# Patient Record
Sex: FEMALE | Race: WHITE | Employment: UNEMPLOYED | URBAN - METROPOLITAN AREA
[De-identification: names, ages, dates, MRNs, and addresses within clinical notes are randomized per-mention and may not be internally consistent; named-entity substitution may affect disease eponyms.]

---

## 2021-01-16 ENCOUNTER — APPOINTMENT (OUTPATIENT)
Dept: GENERAL RADIOLOGY | Age: 61
DRG: 885 | End: 2021-01-16
Attending: EMERGENCY MEDICINE
Payer: COMMERCIAL

## 2021-01-16 ENCOUNTER — HOSPITAL ENCOUNTER (INPATIENT)
Age: 61
LOS: 4 days | Discharge: HOME | DRG: 885 | End: 2021-01-20
Attending: EMERGENCY MEDICINE | Admitting: PSYCHIATRY & NEUROLOGY
Payer: COMMERCIAL

## 2021-01-16 ENCOUNTER — APPOINTMENT (OUTPATIENT)
Dept: CT IMAGING | Age: 61
DRG: 885 | End: 2021-01-16
Attending: EMERGENCY MEDICINE
Payer: COMMERCIAL

## 2021-01-16 DIAGNOSIS — Z00.00 GENERAL MEDICAL EXAM: Primary | ICD-10-CM

## 2021-01-16 PROBLEM — F29 PSYCHOTIC DISORDER (HCC): Status: ACTIVE | Noted: 2021-01-16

## 2021-01-16 LAB
ALBUMIN SERPL-MCNC: 3.1 G/DL (ref 3.5–5)
ALBUMIN/GLOB SERPL: 0.8 {RATIO} (ref 1.1–2.2)
ALP SERPL-CCNC: 131 U/L (ref 45–117)
ALT SERPL-CCNC: 11 U/L (ref 12–78)
AMPHET UR QL SCN: NEGATIVE
ANION GAP SERPL CALC-SCNC: 7 MMOL/L (ref 5–15)
APAP SERPL-MCNC: <2 UG/ML (ref 10–30)
APPEARANCE UR: CLEAR
AST SERPL-CCNC: 14 U/L (ref 15–37)
BACTERIA URNS QL MICRO: ABNORMAL /HPF
BARBITURATES UR QL SCN: NEGATIVE
BASOPHILS # BLD: 0 K/UL (ref 0–0.1)
BASOPHILS NFR BLD: 0 % (ref 0–1)
BENZODIAZ UR QL: NEGATIVE
BILIRUB SERPL-MCNC: 0.4 MG/DL (ref 0.2–1)
BILIRUB UR QL: NEGATIVE
BUN SERPL-MCNC: 9 MG/DL (ref 6–20)
BUN/CREAT SERPL: 11 (ref 12–20)
CALCIUM SERPL-MCNC: 9.1 MG/DL (ref 8.5–10.1)
CANNABINOIDS UR QL SCN: NEGATIVE
CHLORIDE SERPL-SCNC: 104 MMOL/L (ref 97–108)
CO2 SERPL-SCNC: 25 MMOL/L (ref 21–32)
COCAINE UR QL SCN: NEGATIVE
COLOR UR: YELLOW
COMMENT, HOLDF: NORMAL
COVID-19 RAPID TEST, COVR: NOT DETECTED
CREAT SERPL-MCNC: 0.82 MG/DL (ref 0.55–1.02)
DIFFERENTIAL METHOD BLD: ABNORMAL
DRUG SCRN COMMENT,DRGCM: NORMAL
EOSINOPHIL # BLD: 0.3 K/UL (ref 0–0.4)
EOSINOPHIL NFR BLD: 2 % (ref 0–7)
EPITH CASTS URNS QL MICRO: ABNORMAL /LPF
ERYTHROCYTE [DISTWIDTH] IN BLOOD BY AUTOMATED COUNT: 15 % (ref 11.5–14.5)
ETHANOL SERPL-MCNC: <10 MG/DL
GLOBULIN SER CALC-MCNC: 4.1 G/DL (ref 2–4)
GLUCOSE SERPL-MCNC: 104 MG/DL (ref 65–100)
GLUCOSE UR STRIP.AUTO-MCNC: NEGATIVE MG/DL
HCT VFR BLD AUTO: 37 % (ref 35–47)
HEALTH STATUS, XMCV2T: NORMAL
HGB BLD-MCNC: 11.7 G/DL (ref 11.5–16)
HGB UR QL STRIP: ABNORMAL
IMM GRANULOCYTES # BLD AUTO: 0.1 K/UL (ref 0–0.04)
IMM GRANULOCYTES NFR BLD AUTO: 0 % (ref 0–0.5)
KETONES UR QL STRIP.AUTO: NEGATIVE MG/DL
LEUKOCYTE ESTERASE UR QL STRIP.AUTO: NEGATIVE
LYMPHOCYTES # BLD: 2.4 K/UL (ref 0.8–3.5)
LYMPHOCYTES NFR BLD: 14 % (ref 12–49)
MCH RBC QN AUTO: 27.5 PG (ref 26–34)
MCHC RBC AUTO-ENTMCNC: 31.6 G/DL (ref 30–36.5)
MCV RBC AUTO: 87.1 FL (ref 80–99)
METHADONE UR QL: NEGATIVE
MONOCYTES # BLD: 1.4 K/UL (ref 0–1)
MONOCYTES NFR BLD: 8 % (ref 5–13)
NEUTS SEG # BLD: 13.5 K/UL (ref 1.8–8)
NEUTS SEG NFR BLD: 76 % (ref 32–75)
NITRITE UR QL STRIP.AUTO: NEGATIVE
NRBC # BLD: 0 K/UL (ref 0–0.01)
NRBC BLD-RTO: 0 PER 100 WBC
OPIATES UR QL: NEGATIVE
PCP UR QL: NEGATIVE
PH UR STRIP: 6.5 [PH] (ref 5–8)
PLATELET # BLD AUTO: 373 K/UL (ref 150–400)
PMV BLD AUTO: 9.2 FL (ref 8.9–12.9)
POTASSIUM SERPL-SCNC: 3.5 MMOL/L (ref 3.5–5.1)
PROT SERPL-MCNC: 7.2 G/DL (ref 6.4–8.2)
PROT UR STRIP-MCNC: NEGATIVE MG/DL
RBC # BLD AUTO: 4.25 M/UL (ref 3.8–5.2)
RBC #/AREA URNS HPF: ABNORMAL /HPF (ref 0–5)
SALICYLATES SERPL-MCNC: <1.7 MG/DL (ref 2.8–20)
SAMPLES BEING HELD,HOLD: NORMAL
SODIUM SERPL-SCNC: 136 MMOL/L (ref 136–145)
SOURCE, COVRS: NORMAL
SP GR UR REFRACTOMETRY: 1.01 (ref 1–1.03)
SPECIMEN SOURCE, FCOV2M: NORMAL
SPECIMEN TYPE, XMCV1T: NORMAL
UA: UC IF INDICATED,UAUC: ABNORMAL
UROBILINOGEN UR QL STRIP.AUTO: 0.2 EU/DL (ref 0.2–1)
WBC # BLD AUTO: 17.6 K/UL (ref 3.6–11)
WBC URNS QL MICRO: ABNORMAL /HPF (ref 0–4)

## 2021-01-16 PROCEDURE — 81001 URINALYSIS AUTO W/SCOPE: CPT

## 2021-01-16 PROCEDURE — 80143 DRUG ASSAY ACETAMINOPHEN: CPT

## 2021-01-16 PROCEDURE — 87635 SARS-COV-2 COVID-19 AMP PRB: CPT

## 2021-01-16 PROCEDURE — 80053 COMPREHEN METABOLIC PANEL: CPT

## 2021-01-16 PROCEDURE — 71045 X-RAY EXAM CHEST 1 VIEW: CPT

## 2021-01-16 PROCEDURE — 80307 DRUG TEST PRSMV CHEM ANLYZR: CPT

## 2021-01-16 PROCEDURE — 82077 ASSAY SPEC XCP UR&BREATH IA: CPT

## 2021-01-16 PROCEDURE — 80179 DRUG ASSAY SALICYLATE: CPT

## 2021-01-16 PROCEDURE — 74011250637 HC RX REV CODE- 250/637: Performed by: STUDENT IN AN ORGANIZED HEALTH CARE EDUCATION/TRAINING PROGRAM

## 2021-01-16 PROCEDURE — 99285 EMERGENCY DEPT VISIT HI MDM: CPT

## 2021-01-16 PROCEDURE — 71046 X-RAY EXAM CHEST 2 VIEWS: CPT

## 2021-01-16 PROCEDURE — U0005 INFEC AGEN DETEC AMPLI PROBE: HCPCS

## 2021-01-16 PROCEDURE — 36415 COLL VENOUS BLD VENIPUNCTURE: CPT

## 2021-01-16 PROCEDURE — 65270000029 HC RM PRIVATE

## 2021-01-16 PROCEDURE — 90791 PSYCH DIAGNOSTIC EVALUATION: CPT

## 2021-01-16 PROCEDURE — 85025 COMPLETE CBC W/AUTO DIFF WBC: CPT

## 2021-01-16 PROCEDURE — 74011250636 HC RX REV CODE- 250/636: Performed by: EMERGENCY MEDICINE

## 2021-01-16 PROCEDURE — 70450 CT HEAD/BRAIN W/O DYE: CPT

## 2021-01-16 RX ORDER — LORAZEPAM 0.5 MG/1
1 TABLET ORAL
Status: COMPLETED | OUTPATIENT
Start: 2021-01-16 | End: 2021-01-16

## 2021-01-16 RX ORDER — LORAZEPAM 2 MG/ML
1 INJECTION INTRAMUSCULAR ONCE
Status: DISCONTINUED | OUTPATIENT
Start: 2021-01-16 | End: 2021-01-16 | Stop reason: CLARIF

## 2021-01-16 RX ORDER — DOXYCYCLINE HYCLATE 100 MG
100 TABLET ORAL
Status: COMPLETED | OUTPATIENT
Start: 2021-01-16 | End: 2021-01-16

## 2021-01-16 RX ADMIN — LORAZEPAM 1 MG: 0.5 TABLET ORAL at 17:58

## 2021-01-16 RX ADMIN — DOXYCYCLINE HYCLATE 100 MG: 100 TABLET, COATED ORAL at 17:27

## 2021-01-16 RX ADMIN — SODIUM CHLORIDE 1000 ML: 9 INJECTION, SOLUTION INTRAVENOUS at 15:18
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
## 2021-01-16 NOTE — BSMART NOTE
Bed board reports that on call psychiatrist Dr Luann Fuentes requests a PCR Covid result prior to acceptance due to patient's difficulty breathing and abnormal chest Xray. This information was conveyed to patient who says she is fine waiting for the PCR to result because she wants to be admitted to [facility] [place]. Charge nurse and ED provider are aware of plan of care.
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
## 2021-01-16 NOTE — ED NOTES
Pt found standing in room asking to go to the bathroom. PIV pulled out with NS and tubing attached. Pt cleaned and put in new hospital gown. Pt wheeled to bathroom, pt condition stable.

## 2021-01-16 NOTE — ED PROVIDER NOTES
HPI this is a 61-year-old female who according to the  has a prolonged history of of the type of behavior she is displaying presently. He states that she will stay in her room for days at a time and will not come out. She will laugh and cry intermittently for no apparent reason. He is he states that her affect has been off in recent days and has not had a coherent thought process that is been stable. Patient has been intermittently agitated, but not expressing any suicidal or homicidal ideation. She is not eating well or taking fluids very consistently. There has been an intermittent cough, but no fever or chills. She does complain of a lot of diarrhea over the last few weeks. No vomiting. Past Medical History:   Diagnosis Date    Psychiatric disorder        History reviewed. No pertinent surgical history. History reviewed. No pertinent family history.     Social History     Socioeconomic History    Marital status:      Spouse name: Not on file    Number of children: Not on file    Years of education: Not on file    Highest education level: Not on file   Occupational History    Not on file   Social Needs    Financial resource strain: Not on file    Food insecurity     Worry: Not on file     Inability: Not on file    Transportation needs     Medical: Not on file     Non-medical: Not on file   Tobacco Use    Smoking status: Not on file   Substance and Sexual Activity    Alcohol use: Not on file    Drug use: Not on file    Sexual activity: Not on file   Lifestyle    Physical activity     Days per week: Not on file     Minutes per session: Not on file    Stress: Not on file   Relationships    Social connections     Talks on phone: Not on file     Gets together: Not on file     Attends Orthodox service: Not on file     Active member of club or organization: Not on file     Attends meetings of clubs or organizations: Not on file     Relationship status: Not on file    Intimate partner violence     Fear of current or ex partner: Not on file     Emotionally abused: Not on file     Physically abused: Not on file     Forced sexual activity: Not on file   Other Topics Concern    Not on file   Social History Narrative    Not on file         ALLERGIES: Shellfish derived and Codeine    Review of Systems   Constitutional: Positive for fatigue. Negative for activity change, appetite change, chills and fever. HENT: Negative for ear pain, facial swelling, sore throat and trouble swallowing. Eyes: Negative for pain, discharge and visual disturbance. Respiratory: Negative for chest tightness, shortness of breath and wheezing. Cardiovascular: Negative for chest pain and palpitations. Gastrointestinal: Positive for diarrhea. Negative for abdominal pain, blood in stool, nausea and vomiting. Genitourinary: Negative for difficulty urinating, flank pain and hematuria. Musculoskeletal: Negative for arthralgias, joint swelling, myalgias and neck pain. Skin: Negative for color change and rash. Neurological: Negative for dizziness, weakness, numbness and headaches. Hematological: Negative for adenopathy. Does not bruise/bleed easily. Psychiatric/Behavioral: Positive for behavioral problems, confusion, hallucinations and sleep disturbance. The patient is hyperactive. All other systems reviewed and are negative. Vitals:    01/19/21 1200 01/19/21 1540 01/20/21 0745 01/20/21 1130   BP: 122/82 132/88 132/85 (!) 139/92   Pulse: 81 (!) 103 80 80   Resp: 16 16 16 16   Temp: 98.6 °F (37 °C) 98.2 °F (36.8 °C) 98 °F (36.7 °C) 98.5 °F (36.9 °C)   SpO2: 96%  96% 96%   Weight:       Height:                Physical Exam  Vitals signs and nursing note reviewed. Constitutional:       General: She is not in acute distress. Appearance: She is well-developed. Comments: Patient is easily arousable and communicative. She is cooperative.   She does not display currently the symptoms previously noted.  He does appear somewhat lethargic.  Eitel signs are as noted.   HENT:      Head: Normocephalic and atraumatic.      Nose: Nose normal.   Eyes:      General: No scleral icterus.     Conjunctiva/sclera: Conjunctivae normal.      Pupils: Pupils are equal, round, and reactive to light.   Neck:      Musculoskeletal: Normal range of motion and neck supple.      Thyroid: No thyromegaly.      Vascular: No JVD.      Trachea: No tracheal deviation.      Comments: No carotid bruits noted.  Cardiovascular:      Rate and Rhythm: Normal rate and regular rhythm.      Heart sounds: Normal heart sounds. No murmur. No friction rub. No gallop.    Pulmonary:      Effort: Pulmonary effort is normal. No respiratory distress.      Breath sounds: Normal breath sounds. No stridor. No wheezing, rhonchi or rales.   Chest:      Chest wall: No tenderness.   Abdominal:      General: Bowel sounds are normal. There is no distension.      Palpations: Abdomen is soft. There is no mass.      Tenderness: There is no abdominal tenderness. There is no guarding or rebound.   Musculoskeletal: Normal range of motion.         General: No tenderness.   Lymphadenopathy:      Cervical: No cervical adenopathy.   Skin:     General: Skin is warm and dry.      Findings: No erythema or rash.   Neurological:      Mental Status: She is alert and oriented to person, place, and time.      Cranial Nerves: No cranial nerve deficit or dysarthria.      Sensory: No sensory deficit.      Coordination: Coordination normal.      Deep Tendon Reflexes: Reflexes are normal and symmetric.   Psychiatric:         Mood and Affect: Mood is depressed.         Behavior: Behavior is agitated.         Thought Content: Thought content normal.         Judgment: Judgment normal.          MDM  Number of Diagnoses or Management Options     Amount and/or Complexity of Data Reviewed  Clinical lab tests: ordered and reviewed  Tests in the radiology section of CPT®: ordered and  reviewed  Decide to obtain previous medical records or to obtain history from someone other than the patient: yes  Review and summarize past medical records: yes  Discuss the patient with other providers: yes  Independent visualization of images, tracings, or specimens: yes    Risk of Complications, Morbidity, and/or Mortality  Presenting problems: high  Diagnostic procedures: high  Management options: high    Patient Progress  Patient progress: stable         Procedures  I have spoken with the  at length.  He is provided most of the pertinent history.  He is aware of the initial labs.  A chest x-ray and urine are pending.  Given the patient's white count of 17,000 we wanted to rule out any infectious etiology of the patient's symptoms.  Her differential count appears normal.  I have spoken at length with the counselor as well, and the patient will be admitted but there are no beds here.  He is waiting for medical clearance and will likely have to send the patient to [facility] or some of the facility here in [place].   is aware and in agreement.  They are attempting to get her admitted by involuntary commitment.  At the present time we are awaiting chest x-ray and urinalysis.    Patient will be signed out to Dr. Allen pending the balance of the lab and medical clearance.           /  Patient was cleared medically and transferred to the psychiatric unit in stable condition.   Amado Calderon MD     //

## 2021-01-17 LAB
SARS-COV-2, COV2: NOT DETECTED
SPECIMEN SOURCE, FCOV2M: NORMAL

## 2021-01-17 PROCEDURE — 74011250637 HC RX REV CODE- 250/637: Performed by: NURSE PRACTITIONER

## 2021-01-17 PROCEDURE — 65220000003 HC RM SEMIPRIVATE PSYCH

## 2021-01-17 RX ORDER — ALPRAZOLAM 1 MG/1
0.5 TABLET ORAL
COMMUNITY

## 2021-01-17 RX ORDER — OLANZAPINE 5 MG/1
5 TABLET ORAL
Status: DISCONTINUED | OUTPATIENT
Start: 2021-01-17 | End: 2021-01-20 | Stop reason: HOSPADM

## 2021-01-17 RX ORDER — BUPROPION HYDROCHLORIDE 300 MG/1
300 TABLET ORAL DAILY
COMMUNITY

## 2021-01-17 RX ORDER — HALOPERIDOL 5 MG/ML
5 INJECTION INTRAMUSCULAR
Status: DISCONTINUED | OUTPATIENT
Start: 2021-01-17 | End: 2021-01-19

## 2021-01-17 RX ORDER — HYDROXYZINE 50 MG/1
50 TABLET, FILM COATED ORAL
Status: DISCONTINUED | OUTPATIENT
Start: 2021-01-17 | End: 2021-01-18

## 2021-01-17 RX ORDER — BENZTROPINE MESYLATE 1 MG/1
1 TABLET ORAL
Status: DISCONTINUED | OUTPATIENT
Start: 2021-01-17 | End: 2021-01-20 | Stop reason: HOSPADM

## 2021-01-17 RX ORDER — QUETIAPINE FUMARATE 50 MG/1
50 TABLET, FILM COATED ORAL
COMMUNITY
End: 2021-01-20

## 2021-01-17 RX ORDER — OMEPRAZOLE 40 MG/1
40 CAPSULE, DELAYED RELEASE ORAL DAILY
COMMUNITY

## 2021-01-17 RX ORDER — LORAZEPAM 2 MG/ML
1 INJECTION INTRAMUSCULAR
Status: DISCONTINUED | OUTPATIENT
Start: 2021-01-17 | End: 2021-01-20 | Stop reason: HOSPADM

## 2021-01-17 RX ORDER — DIPHENHYDRAMINE HYDROCHLORIDE 50 MG/ML
50 INJECTION, SOLUTION INTRAMUSCULAR; INTRAVENOUS
Status: DISCONTINUED | OUTPATIENT
Start: 2021-01-17 | End: 2021-01-20 | Stop reason: HOSPADM

## 2021-01-17 RX ORDER — DEXTROAMPHETAMINE SACCHARATE, AMPHETAMINE ASPARTATE, DEXTROAMPHETAMINE SULFATE AND AMPHETAMINE SULFATE 5; 5; 5; 5 MG/1; MG/1; MG/1; MG/1
20 TABLET ORAL 4 TIMES DAILY
COMMUNITY
End: 2021-01-20

## 2021-01-17 RX ORDER — TRAZODONE HYDROCHLORIDE 50 MG/1
50 TABLET ORAL
Status: DISCONTINUED | OUTPATIENT
Start: 2021-01-17 | End: 2021-01-19

## 2021-01-17 RX ORDER — VENLAFAXINE HYDROCHLORIDE 150 MG/1
150 CAPSULE, EXTENDED RELEASE ORAL DAILY
COMMUNITY

## 2021-01-17 RX ORDER — ADHESIVE BANDAGE
30 BANDAGE TOPICAL DAILY PRN
Status: DISCONTINUED | OUTPATIENT
Start: 2021-01-17 | End: 2021-01-20 | Stop reason: HOSPADM

## 2021-01-17 RX ORDER — ACETAMINOPHEN 325 MG/1
650 TABLET ORAL
Status: DISCONTINUED | OUTPATIENT
Start: 2021-01-17 | End: 2021-01-20 | Stop reason: HOSPADM

## 2021-01-17 RX ORDER — DOXYCYCLINE HYCLATE 100 MG
100 TABLET ORAL EVERY 12 HOURS
Status: DISCONTINUED | OUTPATIENT
Start: 2021-01-17 | End: 2021-01-20 | Stop reason: HOSPADM

## 2021-01-17 RX ADMIN — DOXYCYCLINE HYCLATE 100 MG: 100 TABLET, COATED ORAL at 21:37

## 2021-01-17 RX ADMIN — ACETAMINOPHEN 650 MG: 325 TABLET ORAL at 20:13

## 2021-01-17 NOTE — PROGRESS NOTES
Pt arrives ambulatory to 7W Acute under voluntary status  UDS/BAL negative  Pt confused about why she is here upon admission, unable to recall events that led up to being in the hospital, she believed she was being taken to a medical unit due to her difficulty breathing. Pt asks if she can call her , RN agrees and pt gives verbal permission for RN to call pt's  and to speak to him. Per , pt has been having periods of altered mental status and emotional lability for \"a long time. \" He states he has kept a written record of events of pt's bizarre behavior. Pt cooperative with admission process, tearful. Signs consent for voluntary admission, pt verbalizes understanding the TDO process and says she does not want to do that. Skin assessment completed by Neisha Baldwin and Yossi Marr RN. No impairment noted, Kannan score is 23. Superficial scratch to R. Buttock and hip. Problem: Altered Thought Process (Adult/Pediatric)  Goal: *STG: Participates in treatment plan  Outcome: Progressing Towards Goal  Goal: *STG: Seeks staff when feelings of anxiety and fear arise  Outcome: Progressing Towards Goal     Problem: Falls - Risk of  Goal: *Absence of Falls  Description: Document Valencia Fall Risk and appropriate interventions in the flowsheet.   Outcome: Progressing Towards Goal  Note: Fall Risk Interventions:

## 2021-01-17 NOTE — ED NOTES
Report received from [address], [facility]. Care assumed.

## 2021-01-17 NOTE — BSMART NOTE
Pt to be admitted to [facility] by ARMANDO Doherty for Dr. Tre Lockwood to room# 728-2. Nursing report . Nursing staff notified of admission/Dotty.

## 2021-01-17 NOTE — ED NOTES
Bedside and Verbal shift change report given to [facility], RN (oncoming nurse) by Jesica Garcia RN (offgoing nurse). Report included the following information SBAR, ED Summary, MAR and Recent Results.

## 2021-01-17 NOTE — ED NOTES
Patient standing in doorway. Stated that it is too loud. And she would like to close her door. Patient educated on the importance of this RN being able to see her to keep her safe. Patient stated understanding and got back into the bed.

## 2021-01-17 NOTE — BH NOTES
TRANSFER - IN REPORT:    Verbal report received from [facility] on Ana Foster  being received from [facility] [place] [facility](unit) for ordered procedure      Report consisted of patients Situation, Background, Assessment and   Recommendations(SBAR). Information from the following report(s) SBAR was reviewed with the receiving nurse. Opportunity for questions and clarification was provided. Assessment completed upon patients arrival to unit and care assumed.

## 2021-01-18 PROCEDURE — 65220000003 HC RM SEMIPRIVATE PSYCH

## 2021-01-18 PROCEDURE — 74011250637 HC RX REV CODE- 250/637: Performed by: PSYCHIATRY & NEUROLOGY

## 2021-01-18 PROCEDURE — 74011250637 HC RX REV CODE- 250/637: Performed by: NURSE PRACTITIONER

## 2021-01-18 RX ORDER — BUPROPION HYDROCHLORIDE 150 MG/1
150 TABLET ORAL
COMMUNITY

## 2021-01-18 RX ORDER — HYDROXYZINE 50 MG/1
50 TABLET, FILM COATED ORAL
Status: DISCONTINUED | OUTPATIENT
Start: 2021-01-18 | End: 2021-01-20 | Stop reason: HOSPADM

## 2021-01-18 RX ORDER — CALCIUM CARBONATE 200(500)MG
400 TABLET,CHEWABLE ORAL
Status: DISCONTINUED | OUTPATIENT
Start: 2021-01-18 | End: 2021-01-20 | Stop reason: HOSPADM

## 2021-01-18 RX ORDER — PANTOPRAZOLE SODIUM 40 MG/1
40 TABLET, DELAYED RELEASE ORAL
Status: DISCONTINUED | OUTPATIENT
Start: 2021-01-18 | End: 2021-01-20 | Stop reason: HOSPADM

## 2021-01-18 RX ORDER — VENLAFAXINE HYDROCHLORIDE 150 MG/1
150 CAPSULE, EXTENDED RELEASE ORAL DAILY
Status: DISCONTINUED | OUTPATIENT
Start: 2021-01-18 | End: 2021-01-20 | Stop reason: HOSPADM

## 2021-01-18 RX ORDER — ALPRAZOLAM 0.5 MG/1
0.5 TABLET ORAL
Status: DISCONTINUED | OUTPATIENT
Start: 2021-01-18 | End: 2021-01-18

## 2021-01-18 RX ORDER — ALPRAZOLAM 1 MG/1
0.5 TABLET ORAL
Status: DISCONTINUED | OUTPATIENT
Start: 2021-01-18 | End: 2021-01-20 | Stop reason: HOSPADM

## 2021-01-18 RX ORDER — IBUPROFEN 200 MG
1 TABLET ORAL DAILY
Status: DISCONTINUED | OUTPATIENT
Start: 2021-01-18 | End: 2021-01-20 | Stop reason: HOSPADM

## 2021-01-18 RX ORDER — QUETIAPINE FUMARATE 25 MG/1
50 TABLET, FILM COATED ORAL
Status: DISCONTINUED | OUTPATIENT
Start: 2021-01-18 | End: 2021-01-19

## 2021-01-18 RX ORDER — BUTALBITAL, ACETAMINOPHEN, CAFFEINE AND CODEINE PHOSPHATE 50; 325; 40; 30 MG/1; MG/1; MG/1; MG/1
1 CAPSULE ORAL
COMMUNITY
End: 2021-01-20

## 2021-01-18 RX ORDER — BUPROPION HYDROCHLORIDE 100 MG/1
200 TABLET, EXTENDED RELEASE ORAL 2 TIMES DAILY
Status: DISCONTINUED | OUTPATIENT
Start: 2021-01-18 | End: 2021-01-20 | Stop reason: HOSPADM

## 2021-01-18 RX ADMIN — DOXYCYCLINE HYCLATE 100 MG: 100 TABLET, COATED ORAL at 08:07

## 2021-01-18 RX ADMIN — DOXYCYCLINE HYCLATE 100 MG: 100 TABLET, COATED ORAL at 20:22

## 2021-01-18 RX ADMIN — PANTOPRAZOLE SODIUM 40 MG: 40 TABLET, DELAYED RELEASE ORAL at 12:21

## 2021-01-18 RX ADMIN — CALCIUM CARBONATE (ANTACID) CHEW TAB 500 MG 400 MG: 500 CHEW TAB at 17:45

## 2021-01-18 RX ADMIN — VENLAFAXINE HYDROCHLORIDE 150 MG: 150 CAPSULE, EXTENDED RELEASE ORAL at 12:22

## 2021-01-18 RX ADMIN — ALPRAZOLAM 0.5 MG: 0.5 TABLET ORAL at 14:42

## 2021-01-18 RX ADMIN — ACETAMINOPHEN 650 MG: 325 TABLET ORAL at 06:28

## 2021-01-18 RX ADMIN — ACETAMINOPHEN 650 MG: 325 TABLET ORAL at 19:57

## 2021-01-18 RX ADMIN — QUETIAPINE FUMARATE 50 MG: 25 TABLET ORAL at 20:22

## 2021-01-18 RX ADMIN — BUPROPION HYDROCHLORIDE 200 MG: 100 TABLET, FILM COATED, EXTENDED RELEASE ORAL at 12:22

## 2021-01-18 RX ADMIN — BUPROPION HYDROCHLORIDE 200 MG: 100 TABLET, FILM COATED, EXTENDED RELEASE ORAL at 17:43

## 2021-01-18 NOTE — BH NOTES
GROUP THERAPY PROGRESS NOTE    Patient is participating in Coping Skills-Depression Group. Group time: 50 minutes    Personal goal for participation: reduce symptom of depression and improve well-being     Goal orientation: Personal    Group therapy participation: active    Therapeutic interventions reviewed and discussed: Group members were handed a worksheet that discussed four research-supported techniques to alleviate symptoms of depression. These techniques include behavioral activation, using social support, positive journaling, and practicing mindfulness. Each member was encouraged to try each activity in group and process how it can applied outside of group. Impression of participation: Mckenna Diaz actively participated in group. 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 Engaged in the group discussion and was very supportive of other group members.       Jose Adames, Supervisee in Social Work

## 2021-01-18 NOTE — PROGRESS NOTES
Received patient resting in bed. Respirations even and unlabored.  Will continue to monitor for safety

## 2021-01-18 NOTE — PROGRESS NOTES
Problem: Altered Thought Process (Adult/Pediatric)  Goal: *STG: Seeks staff when feelings of anxiety and fear arise  Outcome: Progressing Towards Goal  Active polite interaction with staff     Problem: Altered Thought Process (Adult/Pediatric)  Goal: *STG: Decreased hallucinations  Outcome: Progressing Towards Goal   Self reports AH much less      Problem: Falls - Risk of  Goal: *Absence of Falls  Description: Document Valencia Fall Risk and appropriate interventions in the flowsheet. Outcome: Progressing Towards Goal  Note: Fall Risk Interventions:     Gait is observed as steady No Patient Care Coordination Note on file.           Medication Interventions: Teach patient to arise slowly

## 2021-01-18 NOTE — INTERDISCIPLINARY ROUNDS
Behavioral Health Interdisciplinary Rounds     Patient Name: Lindsey Santiago  Age: 61 y.o. Room/Bed:  726/  Primary Diagnosis: <principal problem not specified>   Admission Status: Voluntary     Readmission within 30 days: no  Power of  in place:   Patient requires a blocked bed: no          Reason for blocked bed:     VTE Prophylaxis: No    Mobility needs/Fall risk: no  Flu Vaccine : no   Nutritional Plan: no  Consults:          Labs/Testing due today?: no    Sleep hours:  8      Participation in Care/Groups:    Medication Compliant?: Yes  PRNS (last 24 hours): Pain    Restraints (last 24 hours):  no     CIWA (range last 24 hours):     COWS (range last 24 hours):      Alcohol screening (AUDIT) completed -   AUDIT Score: 0     If applicable, date SBIRT discussed in treatment team AND documented:   AUDIT Screen Score: AUDIT Score: 0      Tobacco - patient is a smoker: Have You Used Tobacco in the Past 30 Days: Yes  Illegal Drugs use: Have You Used Any Illegal Substances Over the Past 12 Months: No    24 hour chart check complete: yes     Patient goal(s) for today:   Treatment team focus/goals: Plan to assess for medications and discharge needs. Progress note : She was pleasant and compliant with her treatment. Denies SI. She talked about her home stressors. LOS:  2  Expected LOS: Wednesday      Financial concerns/prescription coverage: [employer]   Family contact:  Does not want her  and family involved      Family requesting physician contact today:  Discharge plan: she will return home when ready for discharge.    Access to weapons :  no       Outpatient provider(s): Dr Everton Medellin and Dr. Luz Maria Travis   Patient's preferred phone number for follow up call :   Patient's preferred e-mail address :  Participating treatment team members: Mamie Carroll, PharmD. - Arabella Yung, RN
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
## 2021-01-18 NOTE — PROGRESS NOTES
Laboratory Monitoring for Antipsychotics: This patient is currently prescribed the following medication(s):   Current Facility-Administered Medications   Medication Dose Route Frequency    QUEtiapine (SEROquel) tablet 50 mg  50 mg Oral QHS    [START ON 1/19/2021] venlafaxine-SR (EFFEXOR-XR) capsule 150 mg  150 mg Oral DAILY    buPROPion SR (WELLBUTRIN SR) tablet 200 mg  200 mg Oral BID    pantoprazole (PROTONIX) tablet 40 mg  40 mg Oral ACB    influenza vaccine 2020-21 (6 mos+)(PF) (FLUARIX/FLULAVAL/FLUZONE QUAD) injection 0.5 mL  0.5 mL IntraMUSCular PRIOR TO DISCHARGE    doxycycline (VIBRA-TABS) tablet 100 mg  100 mg Oral Q12H     The following labs have been completed for monitoring of antipsychotics and/or mood stabilizers:    Height, Weight, BMI Estimation  Estimated body mass index is 26.31 kg/m² as calculated from the following:    Height as of this encounter: 170.2 cm (67\"). Weight as of this encounter: 76.2 kg (168 lb). Vital Signs/Blood Pressure  Visit Vitals  /85 (BP 1 Location: Right arm, BP Patient Position: Sitting)   Pulse 72   Temp 98.1 °F (36.7 °C)   Resp 16   Ht 170.2 cm (67\")   Wt 76.2 kg (168 lb)   SpO2 97%   BMI 26.31 kg/m²     Renal Function, Hepatic Function and Chemistry  Estimated Creatinine Clearance: 77.6 mL/min (based on SCr of 0.82 mg/dL). Lab Results   Component Value Date/Time    Sodium 136 01/16/2021 12:17 PM    Potassium 3.5 01/16/2021 12:17 PM    Chloride 104 01/16/2021 12:17 PM    CO2 25 01/16/2021 12:17 PM    Anion gap 7 01/16/2021 12:17 PM    BUN 9 01/16/2021 12:17 PM    Creatinine 0.82 01/16/2021 12:17 PM    BUN/Creatinine ratio 11 (L) 01/16/2021 12:17 PM    Bilirubin, total 0.4 01/16/2021 12:17 PM    Protein, total 7.2 01/16/2021 12:17 PM    Albumin 3.1 (L) 01/16/2021 12:17 PM    Globulin 4.1 (H) 01/16/2021 12:17 PM    A-G Ratio 0.8 (L) 01/16/2021 12:17 PM    ALT (SGPT) 11 (L) 01/16/2021 12:17 PM    AST (SGOT) 14 (L) 01/16/2021 12:17 PM    Alk.  phosphatase 131 (H) 01/16/2021 12:17 PM     Lab Results   Component Value Date/Time    Glucose 104 (H) 01/16/2021 12:17 PM     No results found for: HBA1C, HGBE8, OCM0MXWY    Hematology  Lab Results   Component Value Date/Time    WBC 17.6 (H) 01/16/2021 12:17 PM    RBC 4.25 01/16/2021 12:17 PM    HGB 11.7 01/16/2021 12:17 PM    HCT 37.0 01/16/2021 12:17 PM    MCV 87.1 01/16/2021 12:17 PM    MCH 27.5 01/16/2021 12:17 PM    MCHC 31.6 01/16/2021 12:17 PM    RDW 15.0 (H) 01/16/2021 12:17 PM    PLATELET 043 79/54/5483 12:17 PM     Lipids  No results found for: CHOL, CHOLX, CHLST, CHOLV, 367887, HDL, HDLP, LDL, LDLC, DLDLP, TGLX, TRIGL, TRIGP, CHHD, CHHDX    Thyroid Function  No results found for: TSH, TSH2, TSH3, TSHP, TSHELE, TT3, T3U, T3UP, FRT3, FT3, FT4, FT4P, T4, T4P, FT4T, TT7, TSHEXT    Pregnancy Status  No results found for: HCGUQC, [id number], [id number], [id number], PREGU, POCHCG, MHCGN, HCGQR, THCGA1, SHCG, HCGN, HCGURQLPOC    Assessment/Plan:  Will order lipid panel and hemoglobin A1c or fasting glucose to complete the recommended baseline laboratory monitoring based on the patient's current medication regimen.         Анна Ahmadi, CLAUDIAD

## 2021-01-18 NOTE — BH NOTES
PRN Medication Documentation    Specific patient behavior that led to need for PRN medication: Pt c/o migraine/headache  Staff interventions attempted prior to PRN being given: Therapeutic interaction  PRN medication given: tylenol 650mg PO  Patient response/effectiveness of PRN medication: Will continue to assess and monitor

## 2021-01-18 NOTE — PROGRESS NOTES
Problem: Altered Thought Process (Adult/Pediatric)  Goal: *STG: Complies with medication therapy  Outcome: Progressing Towards Goal     Problem: Altered Thought Process (Adult/Pediatric)  Goal: *STG: Decreased delusional thinking  Outcome: Progressing Towards Goal     Patient received in hallway talking to staff. She states she is moving to General Unit. Pt pleasant and cooperative. Pt in no acute distress. Staff will continue to monitor every 15 minutes for safety.
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
## 2021-01-18 NOTE — BH NOTES
At 2015:  TRANSFER - IN REPORT:    Verbal report received from Bryan RN (name) on Boogie Harris  being transferred from [facility] Acute (unit) for routine progression of care      Report consisted of patients Situation, Background, Assessment and   Recommendations(SBAR). Information from the following report(s) SBAR was reviewed with the receiving nurse. Opportunity for questions and clarification was provided. Assessment completed upon patients arrival to unit and care assumed. PTA medication Fioricet has been confirmed at 1 cap Q 4 hours PRN for [place]. Per Dr. Justyn Torres will not be started at this time. Lewis Munson, Pharmacist, aware.

## 2021-01-18 NOTE — BH NOTES
GROUP THERAPY PROGRESS NOTE    Patient is participating in Process group. Group time: 40 minutes    Personal goal for participation: To process feelings around situation     Goal orientation: Personal    Group therapy participation: active    Therapeutic interventions reviewed and discussed: Group discussion was around the code Blue that occurred on the unit earlier. Patients shared their thoughts and feelings around situation and what it meant to them. Group members were encouraged to support each other and practice the coping skill: emotional release. Finally, group concluded with a meditation to alleviate feelings of anxiety. Impression of participation: Lorene Bustos actively participated in group. Remains supportive of other group members. Patient was slightly tearful at the end of group when she told the members that her  of 28 years does not want to be with her anymore.      Jose Adames, Supervisee in Social Work

## 2021-01-18 NOTE — PROGRESS NOTES
Admission Medication Reconciliation:    Information obtained from:  Communication with [facility] pharmacy ([phone]), [facility]  RxQuery data available¹:  NO    Comments/Recommendations: Updated PTA meds/reviewed patient's allergies. 1)  Pharmacist called [facility] pharmacy to confirm PTA medications. Patient reports that she takes alprazolam and generic Adderall PRN. She reports that she does take her medications regularly. 2)  Medication changes (since last review): Added  - Fioricet - take 1 tablet q4h PRN - last filled 12/21/20    Adjusted  - dextroamph/amphetamin 20mg QID (from BID)    3)  The [place] Prescription Monitoring Program () was assessed to determine fill history of any controlled medications. The patient has been filling alprazolam and generic Adderall for >2 years. Currently prescribed by Dr. Gladys Villanueva. ¹RxQuery pharmacy benefit data reflects medications filled and processed through the patient's insurance, however   this data does NOT capture whether the medication was picked up or is currently being taken by the patient. Allergies:  Shellfish derived and Codeine    Significant PMH/Disease States:   Past Medical History:   Diagnosis Date    Psychiatric disorder      Chief Complaint for this Admission:    Chief Complaint   Patient presents with    Altered mental status    Mental Health Problem     Prior to Admission Medications:   Prior to Admission Medications   Prescriptions Last Dose Informant Taking? ALPRAZolam (XANAX) 1 mg tablet Unknown at Unknown time  No   Sig: Take 0.5 mg by mouth four (4) times daily as needed for Anxiety. Indications: anxious   QUEtiapine (SEROquel) 50 mg tablet Unknown at Unknown time  No   Sig: Take 50 mg by mouth nightly.  1-2 tabs   Indications: repeated episodes of anxiety   buPROPion XL (WELLBUTRIN XL) 150 mg tablet Unknown at Unknown time  No   Sig: Take 150 mg by mouth every morning. (take with 300mg XL for total of 450mg/day)   buPROPion XL (Wellbutrin XL) 300 mg XL tablet Unknown at Unknown time  No   Sig: Take 300 mg by mouth daily. (take with 150mg XL for total of 450mg/day)  Indications: anxiousness associated with depression   codeine-butalbital-acetaminophen-caffeine (FIORICET WITH CODEINE) -02-30 mg capsule Unknown at Unknown time  No   Sig: Take 1 Cap by mouth every four (4) hours as needed for Headache. dextroamphetamine-amphetamine (AdderalL) 20 mg tablet Unknown at Unknown time  No   Sig: Take 20 mg by mouth four (4) times daily. Indications: attention deficit disorder with hyperactivity   omeprazole (PRILOSEC) 40 mg capsule Unknown at Unknown time  No   Sig: Take 40 mg by mouth daily. Indications: stress ulcer prevention   venlafaxine-SR (Effexor XR) 150 mg capsule Unknown at Unknown time  No   Sig: Take 150 mg by mouth daily.  Indications: anxiousness associated with depression      Facility-Administered Medications: None     [place], [place]

## 2021-01-19 LAB
CHOLEST SERPL-MCNC: 195 MG/DL
EST. AVERAGE GLUCOSE BLD GHB EST-MCNC: 114 MG/DL
HBA1C MFR BLD: 5.6 % (ref 4–5.6)
HDLC SERPL-MCNC: 49 MG/DL
HDLC SERPL: 4 {RATIO} (ref 0–5)
LDLC SERPL CALC-MCNC: 122.4 MG/DL (ref 0–100)
LIPID PROFILE,FLP: ABNORMAL
TRIGL SERPL-MCNC: 118 MG/DL (ref ?–150)
VLDLC SERPL CALC-MCNC: 23.6 MG/DL

## 2021-01-19 PROCEDURE — 36415 COLL VENOUS BLD VENIPUNCTURE: CPT

## 2021-01-19 PROCEDURE — 83036 HEMOGLOBIN GLYCOSYLATED A1C: CPT

## 2021-01-19 PROCEDURE — 74011250637 HC RX REV CODE- 250/637: Performed by: PSYCHIATRY & NEUROLOGY

## 2021-01-19 PROCEDURE — 65220000003 HC RM SEMIPRIVATE PSYCH

## 2021-01-19 PROCEDURE — 80061 LIPID PANEL: CPT

## 2021-01-19 PROCEDURE — 74011250637 HC RX REV CODE- 250/637: Performed by: NURSE PRACTITIONER

## 2021-01-19 RX ORDER — QUETIAPINE FUMARATE 100 MG/1
100 TABLET, FILM COATED ORAL
Status: DISCONTINUED | OUTPATIENT
Start: 2021-01-19 | End: 2021-01-20 | Stop reason: HOSPADM

## 2021-01-19 RX ADMIN — DOXYCYCLINE HYCLATE 100 MG: 100 TABLET, COATED ORAL at 08:23

## 2021-01-19 RX ADMIN — BUPROPION HYDROCHLORIDE 200 MG: 100 TABLET, FILM COATED, EXTENDED RELEASE ORAL at 08:23

## 2021-01-19 RX ADMIN — PANTOPRAZOLE SODIUM 40 MG: 40 TABLET, DELAYED RELEASE ORAL at 06:29

## 2021-01-19 RX ADMIN — ACETAMINOPHEN 650 MG: 325 TABLET ORAL at 06:45

## 2021-01-19 RX ADMIN — VENLAFAXINE HYDROCHLORIDE 150 MG: 150 CAPSULE, EXTENDED RELEASE ORAL at 08:23

## 2021-01-19 RX ADMIN — BUPROPION HYDROCHLORIDE 200 MG: 100 TABLET, FILM COATED, EXTENDED RELEASE ORAL at 16:08

## 2021-01-19 RX ADMIN — QUETIAPINE FUMARATE 100 MG: 100 TABLET ORAL at 21:07

## 2021-01-19 RX ADMIN — ALPRAZOLAM 0.5 MG: 0.5 TABLET ORAL at 08:23

## 2021-01-19 RX ADMIN — DOXYCYCLINE HYCLATE 100 MG: 100 TABLET, COATED ORAL at 21:07

## 2021-01-19 RX ADMIN — ALPRAZOLAM 0.5 MG: 0.5 TABLET ORAL at 16:08

## 2021-01-19 NOTE — BH NOTES
GROUP THERAPY PROGRESS NOTE    Patient is participating in Goals Group. Group time: 50 minutes    Personal goal for participation: To come up with a plan of things to help maintain wellness of the whole person    Goal orientation: Personal    Group therapy participation: active    Therapeutic interventions reviewed and discussed: Group discussion of the wellness wheel and how they plan to balance themselves by looking at spiritual, physical, occupations, intellectual, social/family, and emotional aspects of their lives. Impression of participation: Annika Dailey actively participated in group. Patient was tearful when she talked about her relationship with her son. Supportive of other group members. Engaged in conversation with the group members.      Jose Adames, Supervisee in Social Work

## 2021-01-19 NOTE — BH NOTES
GROUP THERAPY PROGRESS NOTE    Patient did not participate in Process Group.      Jose Adames, Supervisee in Social Work

## 2021-01-19 NOTE — BH NOTES
PRN Medication Documentation    Specific patient behavior that led to need for PRN medication: pt request for c/o anxiety  Staff interventions attempted prior to PRN being given: listening, coping skills  PRN medication given: Xanax 0.5mg PO given @ 28-81-33-70  Patient response/effectiveness of PRN medication: 0923: patient voiced no further complaints at this time

## 2021-01-19 NOTE — BH NOTES
PRN Medication Documentation    Specific patient behavior that led to need for PRN medication: pt c/o increased head pain, 7/10  Staff interventions attempted prior to PRN being given: decreased stimuli  PRN medication given: tylenol  Patient response/effectiveness of PRN medication: will continue to assess

## 2021-01-19 NOTE — PROGRESS NOTES
Problem: Discharge Planning  Goal: *Discharge to safe environment  Outcome: Progressing Towards Goal  Note: Discharge 1/20/21 and follow-up with OP providers  Goal: *Knowledge of medication management  Outcome: Progressing Towards Goal  Note: Acknowledges understanding of the prescribed medications. Goal: *Knowledge of discharge instructions  Outcome: Progressing Towards Goal  Note: Pt acknowledges she will discharge home, 1/20/21.

## 2021-01-19 NOTE — GROUP NOTE
TAZ  GROUP DOCUMENTATION INDIVIDUAL                                                                          Group Therapy Note    Date: 1/18/2021    Group Start Time: 1900  Group End Time: 1930  Group Topic: Reflection/Relaxation    [facility] 7W ACUTE BEHA Radha Thornton    IP [address] [facility] GROUP DOCUMENTATION GROUP    Group Therapy Note    Attendees: 5          Attendance: Attended    Patient's Goal:  n/a    Interventions/techniques: Provide feedback    Follows Directions:  Followed directions    Interactions: Interacted appropriately    Mental Status: Happy    Behavior/appearance: Cooperative    Goals Achieved: Discussed coping      Additional Notes:      March Lanes

## 2021-01-19 NOTE — PROGRESS NOTES
Problem: Falls - Risk of  Goal: *Absence of Falls  Description: Document Thora Bare Fall Risk and appropriate interventions in the flowsheet. Outcome: Progressing Towards Goal  Note: Fall Risk Interventions:            Medication Interventions: Teach patient to arise slowly              Pt. Received resting in bed,NAD, respirations even and unlabored. Will continue q15 safety rounds.

## 2021-01-19 NOTE — ROUTINE PROCESS
2020: TRANSFER - IN REPORT:    Verbal report received from Amado (name) on Yuni Mir  being received from acute (unit) for routine progression of care      Report consisted of patients Situation, Background, Assessment and   Recommendations(SBAR). Information from the following report(s) SBAR was reviewed with the receiving nurse. Opportunity for questions and clarification was provided. Assessment completed upon patients arrival to unit and care assumed.

## 2021-01-19 NOTE — PROGRESS NOTES
Problem: Altered Thought Process (Adult/Pediatric)  Goal: *STG: Participates in treatment plan  Outcome: Progressing Towards Goal  Patient states that she didn't sleep well due to roommate's snoring. Mood is bright and patient is smiling. Spoke to  yesterday and stated that it went well. Is ready for discharge. Medications reviewed. Continue to monitor.   Goal: *STG: Seeks staff when feelings of anxiety and fear arise  Outcome: Progressing Towards Goal  Goal: *STG: Complies with medication therapy  Outcome: Progressing Towards Goal  Goal: *STG: Attends activities and groups  Outcome: Progressing Towards Goal  Goal: *STG: Demonstrates ability to understand and use improved judgment in daily activities and relationships  Outcome: Progressing Towards Goal  Goal: Interventions  Outcome: Progressing Towards Goal

## 2021-01-19 NOTE — BH NOTES
Chief Complaint:  I feel good today. Length of Stay: 3 Days    Interval History:  Ms. Hermann Lara reports feeling better today. Says she slept poorly last night as her roommate snored very loudly. Denies any SI or plan. Says she had a \"cordial\" conversation with her . Pleasant and calm during the interview. No adverse events are noted from her medications and remains committed to taking them. Past Medical History:  Past Medical History:   Diagnosis Date    Psychiatric disorder            Labs:  Lab Results   Component Value Date/Time    WBC 17.6 (H) 01/16/2021 12:17 PM    HGB 11.7 01/16/2021 12:17 PM    HCT 37.0 01/16/2021 12:17 PM    PLATELET 354 37/58/0467 12:17 PM    MCV 87.1 01/16/2021 12:17 PM      Lab Results   Component Value Date/Time    Sodium 136 01/16/2021 12:17 PM    Potassium 3.5 01/16/2021 12:17 PM    Chloride 104 01/16/2021 12:17 PM    CO2 25 01/16/2021 12:17 PM    Anion gap 7 01/16/2021 12:17 PM    Glucose 104 (H) 01/16/2021 12:17 PM    BUN 9 01/16/2021 12:17 PM    Creatinine 0.82 01/16/2021 12:17 PM    BUN/Creatinine ratio 11 (L) 01/16/2021 12:17 PM    GFR est AA >60 01/16/2021 12:17 PM    GFR est non-AA >60 01/16/2021 12:17 PM    Calcium 9.1 01/16/2021 12:17 PM    Bilirubin, total 0.4 01/16/2021 12:17 PM    Alk.  phosphatase 131 (H) 01/16/2021 12:17 PM    Protein, total 7.2 01/16/2021 12:17 PM    Albumin 3.1 (L) 01/16/2021 12:17 PM    Globulin 4.1 (H) 01/16/2021 12:17 PM    A-G Ratio 0.8 (L) 01/16/2021 12:17 PM    ALT (SGPT) 11 (L) 01/16/2021 12:17 PM      Vitals:    01/18/21 1244 01/18/21 1542 01/19/21 0745 01/19/21 1200   BP: 122/81 130/88 (!) 130/94 122/82   Pulse: 86 (!) 102 79 81   Resp: 16 16 16 16   Temp: 98.2 °F (36.8 °C) 97.8 °F (36.6 °C) 97.7 °F (36.5 °C) 98.6 °F (37 °C)   SpO2: 96% 98% 96% 96%   Weight:       Height:             Current Facility-Administered Medications   Medication Dose Route Frequency Provider Last Rate Last Admin    QUEtiapine (SEROquel) tablet 50 mg 50 mg Oral QHS Hao Wren MD   50 mg at 01/18/21 2022    venlafaxine-SR (EFFEXOR-XR) capsule 150 mg  150 mg Oral DAILY Hao Wren MD   150 mg at 01/19/21 6484    buPROPion SR (WELLBUTRIN SR) tablet 200 mg  200 mg Oral BID Hao Wren MD   200 mg at 01/19/21 4525    ALPRAZolam (XANAX) tablet 0.5 mg  0.5 mg Oral TID PRN Hao Wren MD   0.5 mg at 01/19/21 7888    pantoprazole (PROTONIX) tablet 40 mg  40 mg Oral ACB Hao Wren MD   40 mg at 01/19/21 0629    nicotine (NICODERM CQ) 14 mg/24 hr patch 1 Patch  1 Patch TransDERmal DAILY Hao Wren MD   1 Patch at 01/19/21 8862    hydrOXYzine HCL (ATARAX) tablet 50 mg  50 mg Oral TID PRN Hao Wren MD        calcium carbonate (TUMS) chewable tablet 400 mg [elemental]  400 mg Oral Q8H PRN Hao Wren MD   400 mg at 01/18/21 1745    OLANZapine (ZyPREXA) tablet 5 mg  5 mg Oral Q6H PRN Beth Bradley NP        haloperidol lactate (HALDOL) injection 5 mg  5 mg IntraMUSCular Q6H PRN Beth Bradley NP        benztropine (COGENTIN) tablet 1 mg  1 mg Oral BID PRN Beth Bradley NP        diphenhydrAMINE (BENADRYL) injection 50 mg  50 mg IntraMUSCular BID PRN Beth Bradley NP        LORazepam (ATIVAN) injection 1 mg  1 mg IntraMUSCular Q4H PRN Beth Bradley, NP        traZODone (DESYREL) tablet 50 mg  50 mg Oral QHS PRN Beth Bradley NP        acetaminophen (TYLENOL) tablet 650 mg  650 mg Oral Q4H PRN Beth Bradley NP   650 mg at 01/19/21 0645    magnesium hydroxide (MILK OF MAGNESIA) 400 mg/5 mL oral suspension 30 mL  30 mL Oral DAILY PRN Adrián DALLAS NP        influenza vaccine 2020-21 (6 mos+)(PF) (FLUARIX/FLULAVAL/FLUZONE QUAD) injection 0.5 mL  0.5 mL IntraMUSCular PRIOR TO DISCHARGE Hao Wren MD        doxycycline (VIBRA-TABS) tablet 100 mg  100 mg Oral Q12H Beth Bradley NP   100 mg at 01/19/21 0774         Mental Status Exam:  Eye contact: fair  Grooming: fair  Psychomotor activity: WNL  Speech is spontaneous, coherent  Mood is \"fair\"  Affect: reactive  Perception: Denies any AH or VH  Suicidal ideation: Denies any SI or plan  Cognition is grossly intact       Physical Exam:  Body habitus: Body mass index is 26.31 kg/m². Musculoskeletal system: normal gait  Tremor - neg  Cog wheeling - neg      Assessment and Plan:  Joann Phelan meets criteria for a diagnosis of Mood disorder unspecified, rule out recurrent major depression, rule out bipolar II disorder. Continue the medication regimen as prescribed  Disposition planning to continue and plan for discharge home tomorrow. I certify that this patients inpatient psychiatric hospital services furnished since the previous certification were, and continue to be, required for treatment that could reasonably be expected to improve the patient's condition, or for diagnostic study, and that the patient continues to need, on a daily basis, active treatment furnished directly by or requiring the supervision of inpatient psychiatric facility personnel. In addition, the hospital records show that services furnished were intensive treatment services, admission or related services, or equivalent services.

## 2021-01-19 NOTE — PROGRESS NOTES
Problem: Altered Thought Process (Adult/Pediatric)  Goal: *STG: Complies with medication therapy  Outcome: Progressing Towards Goal     Visible on the unit. Anxious mood noted. Pt denies SI. Compliant with meals and medications. Pt has not reported any concerns at this time.

## 2021-01-19 NOTE — SUICIDE SAFETY PLAN
SAFETY PLAN    A suicide Safety Plan is a document that supports someone when they are having thoughts of suicide. Warning Signs that indicate a suicidal crisis may be developing: What (situations, thoughts, feelings, body sensations, behaviors, etc.) do you experience that lets you know you are beginning to think about suicide? 1.   2.   3. Internal Coping Strategies:  What things can I do (relaxation techniques, hobbies, physical activities, etc.) to take my mind off my problems without contacting another person? 1.   2.   3.     People and social settings that provide distraction: Who can I call or where can I go to distract me? 1. Name:   Phone:   2. Name:   Phone:    3. Place:             4. Place:     People whom I can ask for help: Who can I call when I need help - for example, friends, family, clergy, someone else? 1. Name:                 Phone:   2. Name:   Phone:   3. Name:   Phone:     Professionals or [address] [place], [place] [address]vd I can contact during a crisis: Who can I call for help - for example, my doctor, my psychiatrist, my psychologist, a mental health provider, a suicide hotline? 1. Clinician Name:  Dr. Rubi Hammer   Phone:       Clinician Pager or Emergency Contact #:     2. Clinician Name: Dr. Stefanie Rubi    Phone:       Clinician Pager or Emergency Contact #:     3. Suicide Prevention Lifeline: 3-692-716-TALK (8581)    4. [address] [place] [address] Emergency Services -  for example, [facility] suicide hotline, [facility] Hotline: 211      Emergency Services Address: [address] [place] [address]       Emergency Services Phone: [phone]     Making the environment safe: How can I make my environment (house/apartment/living space) safer? For example, can I remove guns, medications, and other items?   1.   2.

## 2021-01-20 VITALS
HEART RATE: 80 BPM | BODY MASS INDEX: 26.37 KG/M2 | WEIGHT: 168 LBS | DIASTOLIC BLOOD PRESSURE: 92 MMHG | OXYGEN SATURATION: 96 % | HEIGHT: 67 IN | RESPIRATION RATE: 16 BRPM | TEMPERATURE: 98.5 F | SYSTOLIC BLOOD PRESSURE: 139 MMHG

## 2021-01-20 PROCEDURE — 74011250637 HC RX REV CODE- 250/637: Performed by: PSYCHIATRY & NEUROLOGY

## 2021-01-20 PROCEDURE — 74011250637 HC RX REV CODE- 250/637: Performed by: NURSE PRACTITIONER

## 2021-01-20 RX ORDER — QUETIAPINE FUMARATE 100 MG/1
100 TABLET, FILM COATED ORAL
Qty: 30 TAB | Refills: 0 | Status: SHIPPED | OUTPATIENT
Start: 2021-01-20

## 2021-01-20 RX ORDER — DOXYCYCLINE HYCLATE 100 MG
100 TABLET ORAL EVERY 12 HOURS
Qty: 16 TAB | Refills: 0 | Status: SHIPPED | OUTPATIENT
Start: 2021-01-20

## 2021-01-20 RX ADMIN — PANTOPRAZOLE SODIUM 40 MG: 40 TABLET, DELAYED RELEASE ORAL at 05:56

## 2021-01-20 RX ADMIN — ALPRAZOLAM 0.5 MG: 0.5 TABLET ORAL at 05:55

## 2021-01-20 RX ADMIN — VENLAFAXINE HYDROCHLORIDE 150 MG: 150 CAPSULE, EXTENDED RELEASE ORAL at 08:44

## 2021-01-20 RX ADMIN — BUPROPION HYDROCHLORIDE 200 MG: 100 TABLET, FILM COATED, EXTENDED RELEASE ORAL at 08:44

## 2021-01-20 RX ADMIN — DOXYCYCLINE HYCLATE 100 MG: 100 TABLET, COATED ORAL at 08:44

## 2021-01-20 NOTE — PROGRESS NOTES
Laboratory Monitoring for Antipsychotics: This patient is currently prescribed the following medication(s):   Current Facility-Administered Medications   Medication Dose Route Frequency    QUEtiapine (SEROquel) tablet 100 mg  100 mg Oral QHS    venlafaxine-SR (EFFEXOR-XR) capsule 150 mg  150 mg Oral DAILY    buPROPion SR (WELLBUTRIN SR) tablet 200 mg  200 mg Oral BID    pantoprazole (PROTONIX) tablet 40 mg  40 mg Oral ACB    nicotine (NICODERM CQ) 14 mg/24 hr patch 1 Patch  1 Patch TransDERmal DAILY    influenza vaccine 2020-21 (6 mos+)(PF) (FLUARIX/FLULAVAL/FLUZONE QUAD) injection 0.5 mL  0.5 mL IntraMUSCular PRIOR TO DISCHARGE    doxycycline (VIBRA-TABS) tablet 100 mg  100 mg Oral Q12H     The following labs have been completed for monitoring of antipsychotics and/or mood stabilizers:    Height, Weight, BMI Estimation  Estimated body mass index is 26.31 kg/m² as calculated from the following:    Height as of this encounter: 170.2 cm (67\"). Weight as of this encounter: 76.2 kg (168 lb). Vital Signs/Blood Pressure  Visit Vitals  /85   Pulse 80   Temp 98 °F (36.7 °C)   Resp 16   Ht 170.2 cm (67\")   Wt 76.2 kg (168 lb)   SpO2 96%   BMI 26.31 kg/m²     Renal Function, Hepatic Function and Chemistry  Estimated Creatinine Clearance: 77.6 mL/min (by C-G formula based on SCr of 0.82 mg/dL).     Lab Results   Component Value Date/Time    Sodium 136 01/16/2021 12:17 PM    Potassium 3.5 01/16/2021 12:17 PM    Chloride 104 01/16/2021 12:17 PM    CO2 25 01/16/2021 12:17 PM    Anion gap 7 01/16/2021 12:17 PM    BUN 9 01/16/2021 12:17 PM    Creatinine 0.82 01/16/2021 12:17 PM    BUN/Creatinine ratio 11 (L) 01/16/2021 12:17 PM    Bilirubin, total 0.4 01/16/2021 12:17 PM    Protein, total 7.2 01/16/2021 12:17 PM    Albumin 3.1 (L) 01/16/2021 12:17 PM    Globulin 4.1 (H) 01/16/2021 12:17 PM    A-G Ratio 0.8 (L) 01/16/2021 12:17 PM    ALT (SGPT) 11 (L) 01/16/2021 12:17 PM    AST (SGOT) 14 (L) 01/16/2021 12:17 PM Alk. phosphatase 131 (H) 01/16/2021 12:17 PM     Lab Results   Component Value Date/Time    Glucose 104 (H) 01/16/2021 12:17 PM     Lab Results   Component Value Date/Time    Hemoglobin A1c 5.6 01/19/2021 06:37 AM     Hematology  Lab Results   Component Value Date/Time    WBC 17.6 (H) 01/16/2021 12:17 PM    RBC 4.25 01/16/2021 12:17 PM    HGB 11.7 01/16/2021 12:17 PM    HCT 37.0 01/16/2021 12:17 PM    MCV 87.1 01/16/2021 12:17 PM    MCH 27.5 01/16/2021 12:17 PM    MCHC 31.6 01/16/2021 12:17 PM    RDW 15.0 (H) 01/16/2021 12:17 PM    PLATELET 545 38/94/0724 12:17 PM     Lipids  Lab Results   Component Value Date/Time    Cholesterol, total 195 01/19/2021 06:37 AM    HDL Cholesterol 49 01/19/2021 06:37 AM    LDL, calculated 122.4 (H) 01/19/2021 06:37 AM    Triglyceride 118 01/19/2021 06:37 AM    CHOL/HDL Ratio 4.0 01/19/2021 06:37 AM     Thyroid Function  No results found for: TSH, TSH2, TSH3, TSHP, TSHELE, TT3, T3U, T3UP, FRT3, FT3, FT4, FT4P, T4, T4P, FT4T, TT7, TSHEXT    Assessment/Plan:  Recommended baseline laboratory monitoring has been completed based on this patient's current medication regimen. No further interventions are needed at this time. Follow-up metabolic monitoring labs should be completed at least annually.      Umer Mac, CLAUDIAD

## 2021-01-20 NOTE — PROGRESS NOTES
Problem: Altered Thought Process (Adult/Pediatric)  Goal: *STG: Participates in treatment plan  Outcome: Progressing Towards Goal  Goal: *STG: Complies with medication therapy  Outcome: Progressing Towards Goal  Goal: *STG: Attends activities and groups  Outcome: Progressing Towards Goal  Goal: *STG: Demonstrates ability to understand and use improved judgment in daily activities and relationships  Outcome: Progressing Towards Goal  Goal: Interventions  Outcome: Progressing Towards Goal

## 2021-01-20 NOTE — PROGRESS NOTES
Pharmacist Discharge Medication Reconciliation    Discharging Provider: Dr. Neha Donnelly PMH:   Past Medical History:   Diagnosis Date    Psychiatric disorder      Chief Complaint for this Admission:   Chief Complaint   Patient presents with    Altered mental status    Mental Health Problem     Allergies: Shellfish derived and Codeine    Discharge Medications:   Current Discharge Medication List        START taking these medications    Details   doxycycline (VIBRA-TABS) 100 mg tablet Take 1 Tab by mouth every twelve (12) hours. Indications: upper respiratory infection caused by Pneumococcus  Qty: 16 Tab, Refills: 0           CONTINUE these medications which have CHANGED    Details   QUEtiapine (SEROquel) 100 mg tablet Take 1 Tab by mouth nightly. Indications: repeated episodes of anxiety  Qty: 30 Tab, Refills: 0           CONTINUE these medications which have NOT CHANGED    Details   !! buPROPion XL (WELLBUTRIN XL) 150 mg tablet Take 150 mg by mouth every morning. (take with 300mg XL for total of 450mg/day)      ALPRAZolam (XANAX) 1 mg tablet Take 0.5 mg by mouth four (4) times daily as needed for Anxiety. Indications: anxious      venlafaxine-SR (Effexor XR) 150 mg capsule Take 150 mg by mouth daily. Indications: anxiousness associated with depression      !! buPROPion XL (Wellbutrin XL) 300 mg XL tablet Take 300 mg by mouth daily. (take with 150mg XL for total of 450mg/day)  Indications: anxiousness associated with depression      omeprazole (PRILOSEC) 40 mg capsule Take 40 mg by mouth daily. Indications: stress ulcer prevention       ! ! - Potential duplicate medications found. Please discuss with provider.         STOP taking these medications       codeine-butalbital-acetaminophen-caffeine (FIORICET WITH CODEINE) -73-30 mg capsule Comments:   Reason for Stopping:         dextroamphetamine-amphetamine (AdderalL) 20 mg tablet Comments:   Reason for Stopping:             The patient's chart, MAR and AVS were reviewed by Munira Wright PHARMD.

## 2021-01-20 NOTE — GROUP NOTE
[facility]  GROUP DOCUMENTATION INDIVIDUAL                                                                          Group Therapy Note    Date: 1/19/2021    Group Start Time: 2000  Group End Time: 2045  Group Topic: Reflection/Relaxation    [address] [facility] [address] [facility]    Kj Espinal    IP [address] [facility] GROUP DOCUMENTATION GROUP    Group Therapy Note    Attendees: 9/11         Attendance: Attended    Patient's Goal:  Complete self-care assessment    Interventions/techniques: Informed, Provide feedback and Supported    Follows Directions: Followed directions    Interactions: Interacted appropriately    Mental Status: Elevated    Behavior/appearance: Attentive and Cooperative    Goals Achieved: Able to engage in interactions, Able to listen to others, Able to give feedback to another, Able to reflect/comment on own behavior and Able to receive feedback    Additional Notes: Staff met with group to discuss self-care. Staff emphasized importance of self-care and not falling back into old habits once at home. Staff talked through 5 sections of assessment and asked group to point out which aspects they feel they do well or which they'd like to work on. Pt was actively engaged in conversation and encouraged others to speak as well.     Sophie Sor
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
## 2021-01-20 NOTE — PROGRESS NOTES
Problem: Falls - Risk of  Goal: *Absence of Falls  Description: Document Linda Diego Fall Risk and appropriate interventions in the flowsheet.   Outcome: Progressing Towards Goal  Note: Fall Risk Interventions:            Medication Interventions: Teach patient to arise slowly     Pt appears asleep in bed, NAD, even respirations, will continue to monitor q15min               Specific patient behavior that led to need for PRN medication: Anxiety  PRN medication given: Xanax 0.5mg PO at 0555  Patient response/effectiveness of PRN medication: will reassess; 0630 pt resting quietly in bed

## 2021-01-20 NOTE — DISCHARGE INSTRUCTIONS
DISCHARGE SUMMARY    Ed Kashmir  : 1960  MRN: [id number]    The patient Shabnam Andrew exhibits the ability to control behavior in a less restrictive environment. Patient's level of functioning is improving. No assaultive/destructive behavior has been observed for the past 24 hours. No suicidal/homicidal threat or behavior has been observed for the past 24 hours. There is no evidence of serious medication side effects. Patient has not been in physical or protective restraints for at least the past 24 hours. If weapons involved, how are they secured? NA    Is patient aware of and in agreement with discharge plan? yes    Arrangements for medication:  Prescriptions given to patient, given a weeks supply or 30 day supply. Copy of discharge instructions to provider?:  Sharee Miguel of [place], [address] [place], [place] [address] St for transportation home:       Keep all follow up appointments as scheduled, continue to take prescribed medications per physician instructions. Mental health crisis number:  [address] [place] Crisis: [phone]      [address] Emergency WARM LINE      [phone] ([phone])      M-F: 9am to 9pm      Sat & Sun: 5pm - 9pm  National suicide prevention lines:                             8-270-VAUBIKB (9-428-453-241-979-9129)       6-506-370-TALK ([phone])    Crisis Text Line:  Text HOME to JONES Dooley 9 from Nurse    PATIENT INSTRUCTIONS:    What to do at Home:  Recommended activity: Activity as tolerated,     If you experience any of the following symptoms hopelessness, helplessness, thoughts of harming yourself or others, please follow up with  911 or [id number]: [phone]. *  Please give a list of your current medications to your Primary Care Provider.     *  Please update this list whenever your medications are discontinued, doses are      changed, or new medications (including over-the-counter products) are added. *  Please carry medication information at all times in case of emergency situations. These are general instructions for a healthy lifestyle:    No smoking/ No tobacco products/ Avoid exposure to second hand smoke  Surgeon General's Warning:  Quitting smoking now greatly reduces serious risk to your health. Obesity, smoking, and sedentary lifestyle greatly increases your risk for illness    A healthy diet, regular physical exercise & weight monitoring are important for maintaining a healthy lifestyle    You may be retaining fluid if you have a history of heart failure or if you experience any of the following symptoms:  Weight gain of 3 pounds or more overnight or 5 pounds in a week, increased swelling in our hands or feet or shortness of breath while lying flat in bed. Please call your doctor as soon as you notice any of these symptoms; do not wait until your next office visit. The discharge information has been reviewed with the patient. The patient verbalized understanding. Discharge medications reviewed with the patient and appropriate educational materials and side effects teaching were provided.   ___________________________________________________________________________________________________________________________________

## 2021-01-20 NOTE — SUICIDE SAFETY PLAN
SAFETY PLAN    A suicide Safety Plan is a document that supports someone when they are having thoughts of suicide. Warning Signs that indicate a suicidal crisis may be developing: What (situations, thoughts, feelings, body sensations, behaviors, etc.) do you experience that lets you know you are beginning to think about suicide? 1. Sleeping too much  2. Feeling worthless  3. isolating    Internal Coping Strategies:  What things can I do (relaxation techniques, hobbies, physical activities, etc.) to take my mind off my problems without contacting another person? 1. yoga  2. dance  3. Work in [place] and social settings that provide distraction: Who can I call or where can I go to distract me? 1. Name: Yobany Reynolds    2. Name: Jamie Vasquez    3. Place: [facility] whom I can ask for help: Who can I call when I need help - for example, friends, family, clergy, someone else? 1. Name: Dr. Jinny Finnegan              2. Name: Yobany Reynolds       Professionals or Behavioral Health agencies I can contact during a crisis: Who can I call for help - for example, my doctor, my psychiatrist, my psychologist, a mental health provider, a suicide hotline? 1. Clinician Name: Dr. Ingrid Goins Pager or Emergency Contact #:     2. Clinician Name: Dr. Alisa Quezada Pager or Emergency Contact #:     3. Suicide Prevention Lifeline: 5-812-093-TALK (5843)    4. [address] [place] [address] Emergency Services -  for example, [facility]line[facility] Hot[facility]:      Emergency Services Address: [address] [place] [address] [phone]          Making the environment safe: How can I make my environment (house/apartment/living space) safer? For example, can I remove guns, medications, and other items? 1. I don't have guns or excessive medications.

## 2021-01-20 NOTE — INTERDISCIPLINARY ROUNDS
Behavioral Health Interdisciplinary Rounds     Patient Name: Lisa Roth  Age: 61 y.o. Room/Bed:  8/  Primary Diagnosis: <principal problem not specified>   Admission Status: Voluntary     Readmission within 30 days: no  Power of  in place: no  Patient requires a blocked bed: no          Reason for blocked bed:     VTE Prophylaxis: No    Mobility needs/Fall risk: no  Flu Vaccine : no   Nutritional Plan: no  Consults:          Labs/Testing due today?: no    Sleep hours:  5      Participation in Care/Groups:    Medication Compliant?: Yes  PRNS (last 24 hours): Antianxiety    Restraints (last 24 hours):  no     CIWA (range last 24 hours):     COWS (range last 24 hours):      Alcohol screening (AUDIT) completed -   AUDIT Score: 0     If applicable, date SBIRT discussed in treatment team AND documented:   AUDIT Screen Score: AUDIT Score: 0      Document Brief Intervention (corresponds directly with the 5 A's, Ask, Advise, Assess, Assist, and Arrange): At- Risk Patients (Score 7-15 for women; 8-15 for men)  Discuss concern patient is drinking at unhealthy levels known to increase risk of alcohol-related health problems. Is Patient ready to commit to change? If No:   Encourage reflection   Discuss short term and long term health risks of consuming alcohol   Barriers to change   Reaffirm willingness to help / Educational materials provided  If Yes:   Set goal  Textbroker provided    Harmful use or Dependence (Score 16 or greater)   Discuss short term and long term health risks of consuming alcohol   Recommendations   Negotiate drinking goal   Recommend addiction specialist/center   Arrange follow-up appointments.     Tobacco - patient is a smoker: Have You Used Tobacco in the Past 30 Days: Yes  Illegal Drugs use: Have You Used Any Illegal Substances Over the Past 12 Months: No    24 hour chart check complete: yes     Patient goal(s) for today:   Treatment team focus/goals:   Progress note:    LOS:  4  Expected LOS:     Financial concerns/prescription coverage:   Family contact:       Family requesting physician contact today:  Discharge plan:   Access to weapons :      Outpatient provider(s):   Patient's preferred phone number for follow up call :   Patient's preferred e-mail address :  Participating treatment team members: Macho Ruiz, * (assigned SW),
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
## 2021-01-25 NOTE — PROGRESS NOTES
Reached out to patient at [phone] for follow up.  Voicemail came on and no message left due to confidentiality.

## 2021-06-11 ENCOUNTER — HOSPITAL ENCOUNTER (EMERGENCY)
Age: 61
Discharge: HOME | End: 2021-06-11
Attending: EMERGENCY MEDICINE
Payer: COMMERCIAL

## 2021-06-11 VITALS
SYSTOLIC BLOOD PRESSURE: 149 MMHG | HEIGHT: 69 IN | WEIGHT: 200 LBS | DIASTOLIC BLOOD PRESSURE: 89 MMHG | TEMPERATURE: 98.1 F | OXYGEN SATURATION: 100 % | BODY MASS INDEX: 29.62 KG/M2 | HEART RATE: 83 BPM | RESPIRATION RATE: 16 BRPM

## 2021-06-11 DIAGNOSIS — F30.9 MANIA (HCC): Primary | ICD-10-CM

## 2021-06-11 LAB
ALBUMIN SERPL-MCNC: 3.1 G/DL (ref 3.5–5)
ALBUMIN/GLOB SERPL: 0.9 {RATIO} (ref 1.1–2.2)
ALP SERPL-CCNC: 150 U/L (ref 45–117)
ALT SERPL-CCNC: 17 U/L (ref 12–78)
AMPHET UR QL SCN: POSITIVE
ANION GAP SERPL CALC-SCNC: 11 MMOL/L (ref 5–15)
APAP SERPL-MCNC: <2 UG/ML (ref 10–30)
APPEARANCE UR: ABNORMAL
AST SERPL-CCNC: 16 U/L (ref 15–37)
BACTERIA URNS QL MICRO: NEGATIVE /HPF
BARBITURATES UR QL SCN: NEGATIVE
BASOPHILS # BLD: 0.1 K/UL (ref 0–0.1)
BASOPHILS NFR BLD: 1 % (ref 0–1)
BENZODIAZ UR QL: POSITIVE
BILIRUB SERPL-MCNC: 0.2 MG/DL (ref 0.2–1)
BILIRUB UR QL: NEGATIVE
BUN SERPL-MCNC: 15 MG/DL (ref 6–20)
BUN/CREAT SERPL: 15 (ref 12–20)
CALCIUM SERPL-MCNC: 8.6 MG/DL (ref 8.5–10.1)
CANNABINOIDS UR QL SCN: NEGATIVE
CHLORIDE SERPL-SCNC: 103 MMOL/L (ref 97–108)
CO2 SERPL-SCNC: 24 MMOL/L (ref 21–32)
COCAINE UR QL SCN: NEGATIVE
COLOR UR: ABNORMAL
COVID-19 RAPID TEST, COVR: NOT DETECTED
CREAT SERPL-MCNC: 0.97 MG/DL (ref 0.55–1.02)
DIFFERENTIAL METHOD BLD: ABNORMAL
DRUG SCRN COMMENT,DRGCM: ABNORMAL
EOSINOPHIL # BLD: 0.3 K/UL (ref 0–0.4)
EOSINOPHIL NFR BLD: 3 % (ref 0–7)
EPITH CASTS URNS QL MICRO: ABNORMAL /LPF
ERYTHROCYTE [DISTWIDTH] IN BLOOD BY AUTOMATED COUNT: 15.3 % (ref 11.5–14.5)
ETHANOL SERPL-MCNC: <10 MG/DL
GLOBULIN SER CALC-MCNC: 3.6 G/DL (ref 2–4)
GLUCOSE SERPL-MCNC: 147 MG/DL (ref 65–100)
GLUCOSE UR STRIP.AUTO-MCNC: NEGATIVE MG/DL
HCT VFR BLD AUTO: 33 % (ref 35–47)
HGB BLD-MCNC: 10.4 G/DL (ref 11.5–16)
HGB UR QL STRIP: ABNORMAL
IMM GRANULOCYTES # BLD AUTO: 0 K/UL (ref 0–0.04)
IMM GRANULOCYTES NFR BLD AUTO: 0 % (ref 0–0.5)
KETONES UR QL STRIP.AUTO: NEGATIVE MG/DL
LEUKOCYTE ESTERASE UR QL STRIP.AUTO: NEGATIVE
LYMPHOCYTES # BLD: 2.7 K/UL (ref 0.8–3.5)
LYMPHOCYTES NFR BLD: 26 % (ref 12–49)
MCH RBC QN AUTO: 26.3 PG (ref 26–34)
MCHC RBC AUTO-ENTMCNC: 31.5 G/DL (ref 30–36.5)
MCV RBC AUTO: 83.5 FL (ref 80–99)
METHADONE UR QL: NEGATIVE
MONOCYTES # BLD: 1.3 K/UL (ref 0–1)
MONOCYTES NFR BLD: 12 % (ref 5–13)
MUCOUS THREADS URNS QL MICRO: ABNORMAL /LPF
NEUTS SEG # BLD: 6.1 K/UL (ref 1.8–8)
NEUTS SEG NFR BLD: 58 % (ref 32–75)
NITRITE UR QL STRIP.AUTO: NEGATIVE
NRBC # BLD: 0 K/UL (ref 0–0.01)
NRBC BLD-RTO: 0 PER 100 WBC
OPIATES UR QL: NEGATIVE
PCP UR QL: NEGATIVE
PH UR STRIP: 5.5 [PH] (ref 5–8)
PLATELET # BLD AUTO: 365 K/UL (ref 150–400)
PMV BLD AUTO: 9.6 FL (ref 8.9–12.9)
POTASSIUM SERPL-SCNC: 3.5 MMOL/L (ref 3.5–5.1)
PROT SERPL-MCNC: 6.7 G/DL (ref 6.4–8.2)
PROT UR STRIP-MCNC: NEGATIVE MG/DL
RBC # BLD AUTO: 3.95 M/UL (ref 3.8–5.2)
RBC #/AREA URNS HPF: ABNORMAL /HPF (ref 0–5)
SALICYLATES SERPL-MCNC: <1.7 MG/DL (ref 2.8–20)
SODIUM SERPL-SCNC: 138 MMOL/L (ref 136–145)
SOURCE, COVRS: NORMAL
SP GR UR REFRACTOMETRY: >1.03 (ref 1–1.03)
UR CULT HOLD, URHOLD: NORMAL
UROBILINOGEN UR QL STRIP.AUTO: 0.2 EU/DL (ref 0.2–1)
WBC # BLD AUTO: 10.5 K/UL (ref 3.6–11)
WBC URNS QL MICRO: ABNORMAL /HPF (ref 0–4)

## 2021-06-11 PROCEDURE — 85025 COMPLETE CBC W/AUTO DIFF WBC: CPT

## 2021-06-11 PROCEDURE — 87636 SARSCOV2 & INF A&B AMP PRB: CPT

## 2021-06-11 PROCEDURE — 81001 URINALYSIS AUTO W/SCOPE: CPT

## 2021-06-11 PROCEDURE — 80307 DRUG TEST PRSMV CHEM ANLYZR: CPT

## 2021-06-11 PROCEDURE — 82077 ASSAY SPEC XCP UR&BREATH IA: CPT

## 2021-06-11 PROCEDURE — 36415 COLL VENOUS BLD VENIPUNCTURE: CPT

## 2021-06-11 PROCEDURE — 80179 DRUG ASSAY SALICYLATE: CPT

## 2021-06-11 PROCEDURE — 80053 COMPREHEN METABOLIC PANEL: CPT

## 2021-06-11 PROCEDURE — 80143 DRUG ASSAY ACETAMINOPHEN: CPT

## 2021-06-11 PROCEDURE — 99284 EMERGENCY DEPT VISIT MOD MDM: CPT

## 2021-06-11 PROCEDURE — 87635 SARS-COV-2 COVID-19 AMP PRB: CPT

## 2021-06-11 PROCEDURE — 74011250637 HC RX REV CODE- 250/637: Performed by: EMERGENCY MEDICINE

## 2021-06-11 RX ORDER — ACETAMINOPHEN 500 MG
1000 TABLET ORAL
Status: COMPLETED | OUTPATIENT
Start: 2021-06-11 | End: 2021-06-11

## 2021-06-11 RX ADMIN — ACETAMINOPHEN 1000 MG: 500 TABLET, FILM COATED ORAL at 07:35

## 2021-06-11 NOTE — ED TRIAGE NOTES
Triage note:  Pt arrived with Rodney Mckenzie for c/o AMS and ECO. Pt keeps stating she wants to be with her friend that's dying.

## 2021-06-11 NOTE — ED NOTES
7:00 AM  Change of shift. Care of patient taken over from Dr. Erin Camacho; H&P reviewed, handoff complete. Awaiting diagnostics and disposition. VITAL SIGNS:  No data found. LABS:  No results found for this or any previous visit (from the past 6 hour(s)). IMAGING:  No orders to display         Medications During Visit:  Medications   acetaminophen (TYLENOL) tablet 1,000 mg (1,000 mg Oral Given 6/11/21 0735)         DECISION MAKING:  Gwyn Calles is a 64 y.o. female who comes in as above. Here, patient is awake alert oriented. She is not having any hallucinations here. She is been evaluated by crisis multiple times at this time does not meet any type of criteria as she is not homicidal or suicidal.  She does note that she takes Adderall and her urine is positive for amphetamines. According to how she arrived, she is improved. At this time as patient is alert oriented conversant and is making normal conversation, she does have the capacity to make decisions. She does not want to stay, nor describes to feel that she needs to stay. At this time patient is discharged from a medical standpoint back into the custody of the police. IMPRESSION:  1. [address] ([address]        DISPOSITION:  Discharged      There are no discharge medications for this patient. Follow-up Information     Follow up With Specialties Details Why Contact Info    Your PCP  Schedule an appointment as soon as possible for a visit       Your Specialist  Schedule an appointment as soon as possible for a visit       [facility] EMERGENCY DEPT Emergency Medicine Go to  If symptoms worsen, As needed [facility] [address]  [phone]            The patient is asked to follow-up with their primary care provider in the next several days. They are to call tomorrow for an appointment. The patient is asked to return promptly for any increased concerns or worsening of symptoms.   They can return to this emergency department or any other emergency department.

## 2021-06-11 NOTE — ED PROVIDER NOTES
60-year-old female with a history of psychiatric illness presents with a chief complaint of ECO. Patient arrives in the custody of the [facility] department. According to the deputies the patient was found cleaning out her car at 2:00 in the morning in front of a gas station. She reportedly had some manic behavior and was brought for possible admission under TDO. Patient currently has no complaints. She denies any suicidal or homicidal ideation. No past medical history on file. No past surgical history on file. No family history on file. Social History     Socioeconomic History    Marital status: Not on file     Spouse name: Not on file    Number of children: Not on file    Years of education: Not on file    Highest education level: Not on file   Occupational History    Not on file   Tobacco Use    Smoking status: Not on file   Substance and Sexual Activity    Alcohol use: Not on file    Drug use: Not on file    Sexual activity: Not on file   Other Topics Concern    Not on file   Social History Narrative    Not on file     Social Determinants of Health     Financial Resource Strain:     Difficulty of Paying Living Expenses:    Food Insecurity:     Worried About Running Out of Food in the Last Year:     [address] in the Last Year:    Transportation Needs:     Lack of Transportation (Medical):      Lack of Transportation (Non-Medical):    Physical Activity:     Days of Exercise per Week:     Minutes of Exercise per Session:    Stress:     Feeling of Stress :    Social Connections:     Frequency of Communication with Friends and Family:     Frequency of Social Gatherings with Friends and Family:     Attends Religion Services:     Active Member of Clubs or Organizations:     Attends Club or Organization Meetings:     Marital Status:    Intimate Partner Violence:     Fear of Current or Ex-Partner:     Emotionally Abused:     Physically Abused:     Sexually Abused: ALLERGIES: Patient has no allergy information on record. Review of Systems   Constitutional: Negative for fever. HENT: Negative for rhinorrhea. Respiratory: Negative for shortness of breath. Cardiovascular: Negative for chest pain. Gastrointestinal: Negative for abdominal pain. Genitourinary: Negative for dysuria. Musculoskeletal: Negative for back pain. Skin: Negative for wound. Neurological: Negative for headaches. Psychiatric/Behavioral: Negative for suicidal ideas. There were no vitals filed for this visit. Physical Exam  Vitals and nursing note reviewed. Constitutional:       General: She is not in acute distress. Appearance: Normal appearance. She is not ill-appearing, toxic-appearing or diaphoretic. HENT:      Head: Normocephalic and atraumatic. Eyes:      Extraocular Movements: Extraocular movements intact. Cardiovascular:      Rate and Rhythm: Normal rate. Pulses: Normal pulses. Pulmonary:      Effort: Pulmonary effort is normal. No respiratory distress. Abdominal:      General: There is no distension. Musculoskeletal:         General: Normal range of motion. Cervical back: Normal range of motion. Skin:     General: Skin is dry. Neurological:      Mental Status: She is alert and oriented to person, place, and time. Psychiatric:         Attention and Perception: Attention and perception normal.         Mood and Affect: Mood normal.         Speech: Speech is tangential.         Behavior: Behavior is cooperative. Thought Content: Thought content does not include homicidal or suicidal ideation. Cognition and Memory: Memory is not impaired. MDM  Number of Diagnoses or Management Options  Diagnosis management comments: 63-year-old female presents under ECO with concern for acute psychosis. My perception of the patient is that she has manic.   Laboratory studies will be obtained pending TDO.    7 AM  Change of shift. Care of patient signed over to Dr. Lisa Kilgore. Bedside handoff complete. Awaiting labs and TDO.             Procedures

## 2021-06-14 LAB
FLUAV RNA SPEC QL NAA+PROBE: NOT DETECTED
FLUBV RNA SPEC QL NAA+PROBE: NOT DETECTED
SARS-COV-2, COV2: NOT DETECTED

## 2022-03-18 PROBLEM — F29 PSYCHOTIC DISORDER (HCC): Status: ACTIVE | Noted: 2021-01-16

## 2022-04-01 NOTE — INTERDISCIPLINARY ROUNDS
Behavioral Health Interdisciplinary Rounds     Patient Name: Milvia Alejandro  Age: 61 y.o. Room/Bed:  748/02  Primary Diagnosis: <principal problem not specified>   Admission Status: Voluntary     Readmission within 30 days: no  Power of  in place: no  Patient requires a blocked bed: no          Reason for blocked bed:     VTE Prophylaxis: No    Mobility needs/Fall risk: no  Flu Vaccine : no   Nutritional Plan: no  Consults:          Labs/Testing due today?: yes    Sleep hours:  4.5      Participation in Care/Groups:  yes  Medication Compliant?: Yes  PRNS (last 24 hours): Antianxiety and Pain    Restraints (last 24 hours):  no     CIWA (range last 24 hours):     COWS (range last 24 hours):      Alcohol screening (AUDIT) completed -   AUDIT Score: 0     If applicable, date SBIRT discussed in treatment team AND documented:   AUDIT Screen Score: AUDIT Score: 0      Document Brief Intervention (corresponds directly with the 5 A's, Ask, Advise, Assess, Assist, and Arrange): At- Risk Patients (Score 7-15 for women; 8-15 for men)  Discuss concern patient is drinking at unhealthy levels known to increase risk of alcohol-related health problems. Is Patient ready to commit to change? If No:   Encourage reflection   Discuss short term and long term health risks of consuming alcohol   Barriers to change   Reaffirm willingness to help / Educational materials provided  If Yes:   Set goal  Quantified Skin provided    Harmful use or Dependence (Score 16 or greater)   Discuss short term and long term health risks of consuming alcohol   Recommendations   Negotiate drinking goal   Recommend addiction specialist/center   Arrange follow-up appointments.     Tobacco - patient is a smoker: Have You Used Tobacco in the Past 30 Days: Yes  Illegal Drugs use: Have You Used Any Illegal Substances Over the Past 12 Months: No    24 hour chart check complete: yes     Patient goal(s) for today: Treatment team focus/goals:   Progress note     LOS:  3  Expected LOS:     Financial concerns/prescription coverage:    Family contact:       Family requesting physician contact today:    Discharge plan:   Access to weapons :         Outpatient provider(s):   Patient's preferred phone number for follow up call :  Patient's preferred e-mail address :  Participating treatment team members: Adriana Cano, * (assigned SW), Yes

## 2023-05-12 RX ORDER — BUPROPION HYDROCHLORIDE 300 MG/1
TABLET ORAL DAILY
COMMUNITY

## 2023-05-12 RX ORDER — VENLAFAXINE HYDROCHLORIDE 150 MG/1
CAPSULE, EXTENDED RELEASE ORAL DAILY
COMMUNITY

## 2023-05-12 RX ORDER — QUETIAPINE FUMARATE 100 MG/1
TABLET, FILM COATED ORAL
COMMUNITY
Start: 2021-01-20

## 2023-05-12 RX ORDER — ALPRAZOLAM 1 MG/1
TABLET ORAL 4 TIMES DAILY PRN
COMMUNITY

## 2023-05-12 RX ORDER — BUPROPION HYDROCHLORIDE 150 MG/1
TABLET ORAL
COMMUNITY

## 2023-05-12 RX ORDER — OMEPRAZOLE 40 MG/1
CAPSULE, DELAYED RELEASE ORAL DAILY
COMMUNITY

## 2023-05-12 RX ORDER — DOXYCYCLINE HYCLATE 100 MG
TABLET ORAL EVERY 12 HOURS
COMMUNITY
Start: 2021-01-20